# Patient Record
Sex: MALE | Race: AMERICAN INDIAN OR ALASKA NATIVE | NOT HISPANIC OR LATINO | Employment: UNEMPLOYED | URBAN - METROPOLITAN AREA
[De-identification: names, ages, dates, MRNs, and addresses within clinical notes are randomized per-mention and may not be internally consistent; named-entity substitution may affect disease eponyms.]

---

## 2017-10-17 ENCOUNTER — APPOINTMENT (OUTPATIENT)
Dept: GENERAL RADIOLOGY | Facility: CLINIC | Age: 22
End: 2017-10-17
Attending: FAMILY MEDICINE
Payer: COMMERCIAL

## 2017-10-17 ENCOUNTER — HOSPITAL ENCOUNTER (EMERGENCY)
Facility: CLINIC | Age: 22
Discharge: HOME | End: 2017-10-17
Attending: FAMILY MEDICINE | Admitting: FAMILY MEDICINE
Payer: COMMERCIAL

## 2017-10-17 VITALS
WEIGHT: 140 LBS | SYSTOLIC BLOOD PRESSURE: 120 MMHG | HEIGHT: 67 IN | OXYGEN SATURATION: 97 % | RESPIRATION RATE: 20 BRPM | HEART RATE: 100 BPM | BODY MASS INDEX: 21.97 KG/M2 | TEMPERATURE: 98 F | DIASTOLIC BLOOD PRESSURE: 68 MMHG

## 2017-10-17 DIAGNOSIS — S52.222A CLOSED DISPLACED TRANSVERSE FRACTURE OF SHAFT OF LEFT ULNA, INITIAL ENCOUNTER: ICD-10-CM
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
## 2017-10-17 PROCEDURE — 25530 CLTX ULNAR SHFT FX W/O MNPJ: CPT | Mod: LT | Performed by: FAMILY MEDICINE

## 2017-10-17 PROCEDURE — 73090 X-RAY EXAM OF FOREARM: CPT | Mod: TC,LT

## 2017-10-17 PROCEDURE — 99284 EMERGENCY DEPT VISIT MOD MDM: CPT | Mod: 25 | Performed by: FAMILY MEDICINE
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
## 2017-10-17 NOTE — ED AVS SNAPSHOT
[facility] Emergency Department    [address] [place] [address]    Phone:  [phone]    Fax:  [phone]                                       Abe Rojas   MRN: [id number]    Department:  [facility] Emergency Department   Date of Visit:  10/17/2017           After Visit Summary Signature Page     I have received my discharge instructions, and my questions have been answered. I have discussed any challenges I see with this plan with the nurse or doctor.    ..........................................................................................................................................  Patient/Patient Representative Signature      ..........................................................................................................................................  Patient Representative Print Name and Relationship to Patient    ..................................................               ................................................  Date                                            Time    ..........................................................................................................................................  Reviewed by Signature/Title    ...................................................              ..............................................  Date                                                            Time

## 2017-10-17 NOTE — ED AVS SNAPSHOT
[facility] Emergency Department    [address] [place] [address]    Phone:  [phone]    Fax:  [phone]                                       Abe Rojas   MRN: [id number]    Department:  [facility] Emergency Department   Date of Visit:  10/17/2017           Patient Information     Date Of Birth          1995        Your diagnoses for this visit were:     Closed displaced transverse fracture of shaft of left ulna, initial encounter mildly displaced       You were seen by Seth Leiva MD.      Follow-up Information     Follow up with Gamaliel Sherman MD.    Specialty:  Orthopedics    Why:  or one of his partners in one week    Contact information:    [address]  [place] [address]  [phone]          Discharge Instructions       Splint/sling for comfort.  Elevate above heart level as much as possible to help decrease the swelling.  If the splint is too tight, you can loosen the Ace wrap a little bit.  Recheck with orthopedics next week for repeat x-ray and to determine if anything else needs to be done.  It was nice visiting with you this evening.  I hope this heals up quickly for you.  I wish you the very best going forward.    Thank you for choosing [facility]. We appreciate the opportunity to meet your urgent medical needs. Please let us know if we could have done anything to make your stay more satisfying.    After discharge, please closely monitor for any new or worsening symptoms. Return to the Emergency Department if you develop any acute worsening signs or symptoms.    If you had lab work, cultures or imaging studies done during your stay, the final results may still be pending. We will call you if your plan of care needs to change. However, if you are not improving as expected, please follow up with your primary care provider or clinic.     Start any prescription medications that were prescribed to you and take them as  directed.     Please see additional handouts that may be pertinent to your condition.        Upper Extremity Fracture    You have a break (fracture) of the arm, wrist, or hand. This may be a small crack in the bone. Or it may be a major break, with the broken parts pushed out of position. Most fractures will heal without surgery. But you may need surgery if the bones are far out of place or if the break is near the elbow. Treatment is with a special sling called a shoulder immobilizer, or a splint or cast, depending on the type of fracture and where the fracture is. This fracture takes 4 to 6 weeks or longer to heal. The cast may need to be changed in 2 to 3 weeks as swelling goes down.  Home care  Follow these guidelines when caring for yourself:    If you were given a shoulder immobilizer, leave it in place. This will support the injured arm at your side. This is the best position for bone healing. The shoulder immobilizer can be adjusted. If it becomes loose, adjust it so that your forearm is level with the ground (horizontal). Your hand should be level with your elbow.    Put an ice pack on the injured area. Do this for 20 minutes every 1 to 2 hours the first day for pain relief. You can make an ice pack by wrapping a plastic bag of ice cubes in a thin towel. As the ice melts, be careful that the cast/splint/sling doesn t get wet. You can put the ice pack inside the sling and directly over the splint or cast. Continue using the ice pack 3 to 4 times a day for the next 2 days. Then use the ice pack as needed to ease pain and swelling.    Keep the cast, splint, or sling completely dry at all times. Bathe with your cast, splint, or sling out of the water. Protect it with a large plastic bag, rubber-banded at the top end. If a fiberglass cast, splint, or sling gets wet, you can dry it with a hair dryer.    You may use acetaminophen or ibuprofen to control pain, unless another pain medicine was prescribed. If you  have chronic liver or kidney disease, talk with your healthcare provider before using these medicines. Also talk with your provider if you ve had a stomach ulcer or gastrointestinal bleeding.    Don t put creams or objects under the cast if you have itching.  Follow-up care  Follow up with your healthcare provider, or as advised. This is to make sure the bone is healing the way it should.  X-rays may be taken. You will be told of any new findings that may affect your care.  When to seek medical advice  Call your health care provider right away if any of these occur:    The cast or splint cracks    The plaster cast or splint becomes wet or soft    The fiberglass cast or splint stays wet for more than 24 hours    Bad odor from the cast or wound fluid stains the cast    Tightness or pain under the cast or splint gets worse    Fingers become swollen, cold, blue, numb, or tingly    You can t move your fingers    Skin around cast or splint becomes red    Fever of 100.4 F (38 C) or higher, or as directed by your healthcare provider  Date Last Reviewed: 2/1/2017 2000-2017 [employer]. [address] [place] [address]. All rights reserved. This information is not intended as a substitute for professional medical care. Always follow your healthcare professional's instructions.          24 Hour Appointment Hotline       To make an appointment at any [place] clinic, call [phone] ([phone]). If you don't have a family doctor or clinic, we will help you find one. [place] clinics are conveniently located to serve the needs of you and your family.          ED Discharge Orders     COMFORT ARM SLING LARGE                    Review of your medicines      Our records show that you are taking the medicines listed below. If these are incorrect, please call your family doctor or clinic.        Dose / Directions Last dose taken    IBUPROFEN PO   Dose:  800 mg        Take 800 mg by mouth every 8  "hours as needed for moderate pain   Refills:  0                Procedures and tests performed during your visit     Radius/Ulna XR,  PA &LAT, left      Orders Needing Specimen Collection     None      Pending Results     No orders found from 10/15/2017 to 10/18/2017.            Pending Culture Results     No orders found from 10/15/2017 to 10/18/2017.            Pending Results Instructions     If you had any lab results that were not finalized at the time of your Discharge, you can call the ED Lab Result RN at [phone]. You will be contacted by this team for any positive Lab results or changes in treatment. The nurses are available 7 days a week from 10A to 6:30P.  You can leave a message 24 hours per day and they will return your call.        Thank you for choosing [facility]       Thank you for choosing [facility] for your care. Our goal is always to provide you with excellent care. Hearing back from our patients is one way we can continue to improve our services. Please take a few minutes to complete the written survey that you may receive in the mail after you visit with us. Thank you!        Delight Information     Delight lets you send messages to your doctor, view your test results, renew your prescriptions, schedule appointments and more. To sign up, go to [email or web address] . Click on \"Log in\" on the left side of the screen, which will take you to the Welcome page. Then click on \"Sign up Now\" on the right side of the page.     You will be asked to enter the access code listed below, as well as some personal information. Please follow the directions to create your username and password.     Your access code is: [email or web address]  Expires: 1/15/2018  8:21 PM     Your access code will  in 90 days. If you need help or a new code, please call your [facility] clinic or [phone].        Care EveryWhere ID     This is your Care EveryWhere ID. This could be used by other organizations to access your " [place] medical records  [id number]        Equal Access to Services     KEN RACHEL : Hadii amber Son, eddie wright, ralph molina, yelena damon. So [facility] [phone].    ATENCIÓN: Si habla español, tiene a hall disposición servicios gratuitos de asistencia lingüística. Llame al [phone].    We comply with applicable federal civil rights laws and [place] laws. We do not discriminate on the basis of race, color, national origin, age, disability, sex, sexual orientation, or gender identity.            After Visit Summary       This is your record. Keep this with you and show to your community pharmacist(s) and doctor(s) at your next visit.

## 2017-10-18 NOTE — DISCHARGE INSTRUCTIONS
Splint/sling for comfort.  Elevate above heart level as much as possible to help decrease the swelling.  If the splint is too tight, you can loosen the Ace wrap a little bit.  Recheck with orthopedics next week for repeat x-ray and to determine if anything else needs to be done.  It was nice visiting with you this evening.  I hope this heals up quickly for you.  I wish you the very best going forward.    Thank you for choosing [facility]. We appreciate the opportunity to meet your urgent medical needs. Please let us know if we could have done anything to make your stay more satisfying.    After discharge, please closely monitor for any new or worsening symptoms. Return to the Emergency Department if you develop any acute worsening signs or symptoms.    If you had lab work, cultures or imaging studies done during your stay, the final results may still be pending. We will call you if your plan of care needs to change. However, if you are not improving as expected, please follow up with your primary care provider or clinic.     Start any prescription medications that were prescribed to you and take them as directed.     Please see additional handouts that may be pertinent to your condition.        Upper Extremity Fracture    You have a break (fracture) of the arm, wrist, or hand. This may be a small crack in the bone. Or it may be a major break, with the broken parts pushed out of position. Most fractures will heal without surgery. But you may need surgery if the bones are far out of place or if the break is near the elbow. Treatment is with a special sling called a shoulder immobilizer, or a splint or cast, depending on the type of fracture and where the fracture is. This fracture takes 4 to 6 weeks or longer to heal. The cast may need to be changed in 2 to 3 weeks as swelling goes down.  Home care  Follow these guidelines when caring for yourself:    If you were given a shoulder immobilizer, leave it  in place. This will support the injured arm at your side. This is the best position for bone healing. The shoulder immobilizer can be adjusted. If it becomes loose, adjust it so that your forearm is level with the ground (horizontal). Your hand should be level with your elbow.    Put an ice pack on the injured area. Do this for 20 minutes every 1 to 2 hours the first day for pain relief. You can make an ice pack by wrapping a plastic bag of ice cubes in a thin towel. As the ice melts, be careful that the cast/splint/sling doesn t get wet. You can put the ice pack inside the sling and directly over the splint or cast. Continue using the ice pack 3 to 4 times a day for the next 2 days. Then use the ice pack as needed to ease pain and swelling.    Keep the cast, splint, or sling completely dry at all times. Bathe with your cast, splint, or sling out of the water. Protect it with a large plastic bag, rubber-banded at the top end. If a fiberglass cast, splint, or sling gets wet, you can dry it with a hair dryer.    You may use acetaminophen or ibuprofen to control pain, unless another pain medicine was prescribed. If you have chronic liver or kidney disease, talk with your healthcare provider before using these medicines. Also talk with your provider if you ve had a stomach ulcer or gastrointestinal bleeding.    Don t put creams or objects under the cast if you have itching.  Follow-up care  Follow up with your healthcare provider, or as advised. This is to make sure the bone is healing the way it should.  X-rays may be taken. You will be told of any new findings that may affect your care.  When to seek medical advice  Call your health care provider right away if any of these occur:    The cast or splint cracks    The plaster cast or splint becomes wet or soft    The fiberglass cast or splint stays wet for more than 24 hours    Bad odor from the cast or wound fluid stains the cast    Tightness or pain under the cast or  splint gets worse    Fingers become swollen, cold, blue, numb, or tingly    You can t move your fingers    Skin around cast or splint becomes red    Fever of 100.4 F (38 C) or higher, or as directed by your healthcare provider  Date Last Reviewed: 2/1/2017 2000-2017 [employer]. [address] [place] [address]. All rights reserved. This information is not intended as a substitute for professional medical care. Always follow your healthcare professional's instructions.
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
## 2022-08-02 ENCOUNTER — HOSPITAL ENCOUNTER (EMERGENCY)
Facility: CLINIC | Age: 27
Discharge: HOME | End: 2022-08-02
Attending: EMERGENCY MEDICINE | Admitting: EMERGENCY MEDICINE
Payer: COMMERCIAL

## 2022-08-02 VITALS
OXYGEN SATURATION: 97 % | SYSTOLIC BLOOD PRESSURE: 102 MMHG | BODY MASS INDEX: 23.65 KG/M2 | DIASTOLIC BLOOD PRESSURE: 73 MMHG | RESPIRATION RATE: 16 BRPM | WEIGHT: 151 LBS | HEART RATE: 115 BPM | TEMPERATURE: 98.8 F

## 2022-08-02 DIAGNOSIS — M25.512 LEFT SHOULDER PAIN, UNSPECIFIED CHRONICITY: ICD-10-CM

## 2022-08-02 PROCEDURE — 250N000013 HC RX MED GY IP 250 OP 250 PS 637: Performed by: EMERGENCY MEDICINE

## 2022-08-02 PROCEDURE — 99283 EMERGENCY DEPT VISIT LOW MDM: CPT | Performed by: EMERGENCY MEDICINE

## 2022-08-02 RX ORDER — LIDOCAINE 4 G/G
1 PATCH TOPICAL ONCE
Status: DISCONTINUED | OUTPATIENT
Start: 2022-08-02 | End: 2022-08-03 | Stop reason: HOSPADM

## 2022-08-02 RX ORDER — ACETAMINOPHEN 325 MG/1
650 TABLET ORAL EVERY 4 HOURS PRN
Status: DISCONTINUED | OUTPATIENT
Start: 2022-08-02 | End: 2022-08-03 | Stop reason: HOSPADM

## 2022-08-02 RX ORDER — IBUPROFEN 800 MG/1
800 TABLET, FILM COATED ORAL ONCE
Status: COMPLETED | OUTPATIENT
Start: 2022-08-02 | End: 2022-08-02

## 2022-08-02 RX ADMIN — IBUPROFEN 800 MG: 800 TABLET, FILM COATED ORAL at 21:22

## 2022-08-02 RX ADMIN — LIDOCAINE PATCH 4% 1 PATCH: 40 PATCH TOPICAL at 21:22

## 2022-08-02 ASSESSMENT — ENCOUNTER SYMPTOMS
FEVER: 0
CONFUSION: 0
BACK PAIN: 0
ARTHRALGIAS: 1
TREMORS: 0
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
